# Patient Record
Sex: MALE | Race: BLACK OR AFRICAN AMERICAN | Employment: FULL TIME | URBAN - METROPOLITAN AREA
[De-identification: names, ages, dates, MRNs, and addresses within clinical notes are randomized per-mention and may not be internally consistent; named-entity substitution may affect disease eponyms.]

---

## 2020-04-09 ENCOUNTER — HOSPITAL ENCOUNTER (EMERGENCY)
Age: 30
Discharge: HOME | End: 2020-04-09
Attending: EMERGENCY MEDICINE

## 2020-04-09 VITALS
HEART RATE: 89 BPM | RESPIRATION RATE: 16 BRPM | WEIGHT: 243.83 LBS | DIASTOLIC BLOOD PRESSURE: 87 MMHG | HEIGHT: 70 IN | BODY MASS INDEX: 34.91 KG/M2 | TEMPERATURE: 99.3 F | OXYGEN SATURATION: 100 % | SYSTOLIC BLOOD PRESSURE: 145 MMHG

## 2020-04-09 PROCEDURE — 99281 EMR DPT VST MAYX REQ PHY/QHP: CPT

## 2020-04-09 NOTE — ED PROVIDER NOTES
Attends [employer] service: Not on file     Active member of club or organization: Not on file     Attends meetings of clubs or organizations: Not on file     Relationship status: Not on file    Intimate partner violence     Fear of current or ex partner: Not on file     Emotionally abused: Not on file     Physically abused: Not on file     Forced sexual activity: Not on file   Other Topics Concern    Not on file   Social History Narrative    Not on file     No current facility-administered medications for this encounter. Current Outpatient Medications   Medication Sig Dispense Refill    ibuprofen (ADVIL;MOTRIN) 600 MG tablet Take 1 tablet by mouth every 6 hours as needed for Pain 20 tablet 0    clotrimazole (LOTRIMIN) 1 % cream Apply topically 3 times daily to lesions on skin. 40 g 0    miconazole (ZEASORB-AF) 2 % powder Place powder in shoes before wearing daily 45 g 1     No Known Allergies      REVIEW OF SYSTEMS  10 systems reviewed, pertinent positives per HPI otherwise noted to be negative    PHYSICAL EXAM  BP (!) 145/87   Pulse 89   Temp 99.3 °F (37.4 °C) (Oral)   Resp 16   Ht 5' 10\" (1.778 m)   Wt 243 lb 13.3 oz (110.6 kg)   SpO2 100%   BMI 34.99 kg/m²      CONSTITUTIONAL: AOx4, cooperative with exam, afebrile   HEAD: normocephalic, atraumatic   EYES: PERRL, EOMI, anicteric sclera   ENT: Moist mucous membranes, uvula midline   NECK: Supple, symmetric, trachea midline   LUNGS: Bilateral breath sounds, CTAB, no rales/ronchi/wheezes   CARDIOVASCULAR: RRR, normal S1/S2, no m/r/g, 2+ pulses throughout   ABDOMEN: Soft, non-tender, non-distended, +BS   NEUROLOGIC:  MAEx4, GCS 15   MUSCULOSKELETAL: No clubbing, cyanosis or edema   SKIN: No rash, pallor or wounds on exposed surfaces         RADIOLOGY  X-RAYS:  I have reviewed radiologic plain film image(s). ALL OTHER NON-PLAIN FILM IMAGES SUCH AS CT, ULTRASOUND AND MRI HAVE BEEN READ BY THE RADIOLOGIST.   No orders to display          EKG INTERPRETATION  None    PROCEDURES    ED COURSE/MDM    Patient seen and evaluated. History and physical as above. Patient had recent upper respiratory infection with cough and fever. Patient requesting work note to return to work. Has been fever free for the past 3 days and no longer having a cough. No antipyretic use. According to CDC guidelines, patient is cleared to go back to work tomorrow as tomorrow will be day 4 without a fever. Patient instructed to wear a mask and perform proper handwashing techniques throughout the day. Patient agreeable. States his work does have masks. Return instructions provided. All questions answered. PCP referral provided at discharge. The patient was counseled to closely monitor their symptoms, and to return immediately to the Emergency Department for any difficulty breathing, confusion, dizziness or passing out, vomiting, chest pain, high fevers, weakness, or any other new or concerning symptoms. There is no evidence of emergent medical condition at this time, and the patient will be discharged in good condition. Patient was given scripts for the following medications. I counseled patient how to take these medications. New Prescriptions    No medications on file           CLINICAL IMPRESSION  1. Return to work exam        Blood pressure (!) 145/87, pulse 89, temperature 99.3 °F (37.4 °C), temperature source Oral, resp. rate 16, height 5' 10\" (1.778 m), weight 243 lb 13.3 oz (110.6 kg), SpO2 100 %. DISPOSITION  Patient was discharged to home in good condition. [facility]  [phone]  Call today  For a follow up appointment. Disclaimer: All medical record entries made by [employer] dictation.       (Please note that this note was completed with a voice recognition program. Every attempt was made to edit the dictations, but inevitably there remain words that are mis-transcribed.)            Martir Montesinos MD  04/09/20 [id number]